# Patient Record
Sex: FEMALE | Race: WHITE | NOT HISPANIC OR LATINO | Employment: FULL TIME | URBAN - METROPOLITAN AREA
[De-identification: names, ages, dates, MRNs, and addresses within clinical notes are randomized per-mention and may not be internally consistent; named-entity substitution may affect disease eponyms.]

---

## 2022-10-07 ENCOUNTER — OFFICE VISIT (OUTPATIENT)
Dept: URGENT CARE | Facility: CLINIC | Age: 37
End: 2022-10-07
Payer: COMMERCIAL

## 2022-10-07 VITALS
OXYGEN SATURATION: 98 % | HEART RATE: 68 BPM | DIASTOLIC BLOOD PRESSURE: 67 MMHG | WEIGHT: 174.6 LBS | RESPIRATION RATE: 16 BRPM | TEMPERATURE: 97.9 F | SYSTOLIC BLOOD PRESSURE: 115 MMHG

## 2022-10-07 DIAGNOSIS — Z87.09 HISTORY OF ASTHMA: ICD-10-CM

## 2022-10-07 DIAGNOSIS — Z76.0 ENCOUNTER FOR MEDICATION REFILL: Primary | ICD-10-CM

## 2022-10-07 PROCEDURE — 99213 OFFICE O/P EST LOW 20 MIN: CPT | Performed by: NURSE PRACTITIONER

## 2022-10-07 RX ORDER — BECLOMETHASONE DIPROPIONATE HFA 80 UG/1
2 AEROSOL, METERED RESPIRATORY (INHALATION)
Qty: 10.6 G | Refills: 0 | Status: SHIPPED | OUTPATIENT
Start: 2022-10-07

## 2022-10-07 RX ORDER — ALBUTEROL SULFATE 90 UG/1
2 AEROSOL, METERED RESPIRATORY (INHALATION) EVERY 6 HOURS PRN
Qty: 8.5 G | Refills: 0 | Status: SHIPPED | OUTPATIENT
Start: 2022-10-07

## 2022-10-07 RX ORDER — NORETHINDRONE ACETATE AND ETHINYL ESTRADIOL, AND FERROUS FUMARATE 1MG-20(24)
1 KIT ORAL DAILY
COMMUNITY
Start: 2022-08-12

## 2022-10-07 NOTE — PATIENT INSTRUCTIONS
You have been given refills per your request and dosages as you stated    Follow up with your PCP when you return home if needed   Go to a local emergency department if you are having asthma attack or trouble breathing

## 2022-10-07 NOTE — PROGRESS NOTES
[facility]        NAME: Hayley Nguyễn is a 40 y o  female  : 1985    MRN: [id number]  DATE: 2022  TIME: 10:00 AM    Assessment and Plan   Encounter for medication refill [Z76 0]  1  Encounter for medication refill  albuterol (ProAir HFA) 90 mcg/act inhaler    beclomethasone (Qvar RediHaler) 80 MCG/ACT inhaler   2  History of asthma  albuterol (ProAir HFA) 90 mcg/act inhaler    beclomethasone (Qvar RediHaler) 80 MCG/ACT inhaler         Patient Instructions       Follow up with PCP in 3-5 days  Proceed to  ER if symptoms worsen  You have been given refills per your request and dosages as you stated  Follow up with your PCP when you return home if needed   Go to a local emergency department if you are having asthma attack or trouble breathing         Chief Complaint     Chief Complaint   Patient presents with    Medication Refill     Has asthma, needs refill- albuterol inhaler & Qvar          History of Present Illness       This is a 40year old female who states has asthma and is here visiting her parents and forgot her inhalers  She is requesting refills on Qvar 80 mcg 2 puffs hs and Albuterol MDI rescue inhaler  She denies any symptoms at this time  She denies pregnancy  Pt is new pt and no PMH or meds noted in Epic       Review of Systems   Review of Systems   Constitutional: Negative  HENT: Negative  Eyes: Negative  Respiratory: Negative  Cardiovascular: Negative  Gastrointestinal: Negative  Endocrine: Negative  Genitourinary: Negative  Musculoskeletal: Negative  Skin: Negative  Allergic/Immunologic: Negative  Neurological: Negative  Hematological: Negative  Psychiatric/Behavioral: Negative            Current Medications       Current Outpatient Medications:     albuterol (ProAir HFA) 90 mcg/act inhaler, Inhale 2 puffs every 6 (six) hours as needed for wheezing or shortness of breath, Disp: 8 5 g, Rfl: 0    beclomethasone (Qvar RediHaler) 80 MCG/ACT inhaler, Inhale 2 puffs daily at bedtime Rinse mouth after use , Disp: 10 6 g, Rfl: 0    Norethin Ace-Eth Estrad-FE 1-20 MG-MCG(24) CAPS, Take 1 capsule by mouth daily, Disp: , Rfl:     Current Allergies     Allergies as of 10/07/2022    (No Known Allergies)            The following portions of the patient's history were reviewed and updated as appropriate: allergies, current medications, past family history, past medical history, past social history, past surgical history and problem list      Past Medical History:   Diagnosis Date    Asthma        Past Surgical History:   Procedure Laterality Date     SECTION  2021       History reviewed  No pertinent family history  Medications have been verified  Objective   /67 (BP Location: Left arm)   Pulse 68   Temp 97 9 °F (36 6 °C)   Resp 16   Wt 79 2 kg (174 lb 9 6 oz)   SpO2 98%   No LMP recorded  Physical Exam     Physical Exam  Vitals and nursing note reviewed  Constitutional:       General: She is not in acute distress  Appearance: Normal appearance  She is normal weight  She is not ill-appearing, toxic-appearing or diaphoretic  HENT:      Head: Normocephalic and atraumatic  Eyes:      Extraocular Movements: Extraocular movements intact  Cardiovascular:      Rate and Rhythm: Normal rate and regular rhythm  Pulses: Normal pulses  Heart sounds: Normal heart sounds  Pulmonary:      Effort: Pulmonary effort is normal  No respiratory distress  Breath sounds: Normal breath sounds  No stridor  No wheezing, rhonchi or rales  Chest:      Chest wall: No tenderness  Abdominal:      General: There is no distension  Palpations: Abdomen is soft  Tenderness: There is no abdominal tenderness  Musculoskeletal:         General: Normal range of motion  Cervical back: Normal range of motion and neck supple  Skin:     General: Skin is warm and dry        Capillary Refill: Capillary refill takes less than 2 seconds  Neurological:      General: No focal deficit present  Mental Status: She is alert and oriented to person, place, and time  Psychiatric:         Mood and Affect: Mood normal          Behavior: Behavior normal          Thought Content:  Thought content normal          Judgment: Judgment normal